# Patient Record
Sex: FEMALE | Race: BLACK OR AFRICAN AMERICAN
[De-identification: names, ages, dates, MRNs, and addresses within clinical notes are randomized per-mention and may not be internally consistent; named-entity substitution may affect disease eponyms.]

---

## 2018-04-10 ENCOUNTER — TRANSCRIPTION ENCOUNTER (OUTPATIENT)
Age: 38
End: 2018-04-10

## 2018-05-01 PROBLEM — Z00.00 ENCOUNTER FOR PREVENTIVE HEALTH EXAMINATION: Status: ACTIVE | Noted: 2018-05-01

## 2018-05-24 ENCOUNTER — APPOINTMENT (OUTPATIENT)
Dept: OBGYN | Facility: CLINIC | Age: 38
End: 2018-05-24
Payer: COMMERCIAL

## 2018-05-24 PROCEDURE — 99395 PREV VISIT EST AGE 18-39: CPT

## 2018-05-29 ENCOUNTER — APPOINTMENT (OUTPATIENT)
Dept: OBGYN | Facility: CLINIC | Age: 38
End: 2018-05-29
Payer: COMMERCIAL

## 2018-05-29 PROCEDURE — 76830 TRANSVAGINAL US NON-OB: CPT

## 2020-08-04 ENCOUNTER — EMERGENCY (EMERGENCY)
Facility: HOSPITAL | Age: 40
LOS: 0 days | Discharge: HOME | End: 2020-08-04
Attending: EMERGENCY MEDICINE | Admitting: EMERGENCY MEDICINE
Payer: MEDICAID

## 2020-08-04 VITALS
TEMPERATURE: 99 F | DIASTOLIC BLOOD PRESSURE: 100 MMHG | HEART RATE: 90 BPM | RESPIRATION RATE: 20 BRPM | OXYGEN SATURATION: 98 % | SYSTOLIC BLOOD PRESSURE: 176 MMHG

## 2020-08-04 VITALS — HEIGHT: 67 IN | WEIGHT: 270.07 LBS

## 2020-08-04 PROCEDURE — 99282 EMERGENCY DEPT VISIT SF MDM: CPT

## 2020-08-04 NOTE — ED PROVIDER NOTE - PROGRESS NOTE DETAILS
No submandibular or floor or mouth swelling, voice change, odynophagia or drooling, fever, chills, or inhaled tooth fragment.   Exam shows caries, no inflamed alveolus, no discharge of purulence or abscess.  A/P: Likely caries and dental infection. Will transfer to dental. No sign of deep infection, airway compromise, or spread into surrounding structures.

## 2020-08-04 NOTE — ED ADULT NURSE NOTE - NSIMPLEMENTINTERV_GEN_ALL_ED
Implemented All Universal Safety Interventions:  [place] to call system. Call bell, personal items and telephone within reach. Instruct patient to call for assistance. Room bathroom lighting operational. Non-slip footwear when patient is off stretcher. Physically safe environment: no spills, clutter or unnecessary equipment. Stretcher in lowest position, wheels locked, appropriate side rails in place.

## 2020-08-04 NOTE — ED PROVIDER NOTE - ATTENDING CONTRIBUTION TO CARE
41 y/o f w/ no pmhx presents for dental pain to tooth number 19 since July 17th, pt saw her physician and finished course of amoxicillin, also given ibuprofen 600 mg tablets, given referral to specialist in [place] but was unable to make appt so came to ed. denies fever, chills, n/v, cp, sob, pleuritic chest pain, palpitations, diaphoresis, cough, drooling/secretions, trismus, neck swelling, neck stiffness, muffled/change in voice, dysphagia, odonoyphagia, drainage, trauma, weakness, numbness/tingling, sick contacts, recent travel or rash.       on exam: wdwn female sitting on stretcher speaking full sentences, no sniffing position, no muffled or hot potatoe voice, no rash, no facial swelling, mmm, poor dental hygiene, (+) dentla caries, pain to palpation to tooth # 19 with no surrounding fluctuance/induration, no halitosis, no trismus, no drooling/secretions, no pain to floor of mouth, no elevation to floor of mouth, no oropharyngeal edema, uvula midline, no PTA, no neck swelling- supple, non-tender, no anterior neck pain, RRR, radial pulses 2/4 b/l, ctabl w/ breath sounds present b/l, no wheezing or crackles, good air exchange, good respiratory effort, no accessory muscle use, no tachypnea, no stridor,  AAOx3. CN II-XII intact, no facial droop or slurring of speech, no focal deficits.

## 2020-08-04 NOTE — ED PROVIDER NOTE - CLINICAL SUMMARY MEDICAL DECISION MAKING FREE TEXT BOX
pt aware of plan for transfer to dental clinic, in no resp distress, no drooling or secretions, agrees with plan.

## 2020-08-04 NOTE — ED PROVIDER NOTE - OBJECTIVE STATEMENT
40 y.o. F with PMH of root canal with dental pain #19, pt was suppose to have a root canal scheduled but was in a lot of pain, given amoxicillen and ibuprofen with improvement, no abscess drainage or bleeding.

## 2020-08-04 NOTE — ED PROVIDER NOTE - NS ED ROS FT
Constitutional:  See HPI.   Eyes:  No visual changes, eye pain or discharge.  ENMT: + Dental pain,  No hearing changes, pain, discharge or infections. No neck pain or stiffness.  Cardiac:  No chest pain, SOB or edema. No chest pain with exertion.  Respiratory:  No cough or respiratory distress. No hemoptysis.  GI:  No nausea, vomiting, diarrhea, abdominal pain.  :  No dysuria, frequency, hematuria  MS:  No joint pain or back pain.  Neuro:  No LOC. No headache or weakness.    Skin:  No skin rash.  Except as in HPI, all other review of systems is negative

## 2020-08-04 NOTE — ED PROVIDER NOTE - PHYSICAL EXAMINATION
CONSTITUTIONAL: Well-developed; well-nourished; in no acute distress.   SKIN: warm, dry  HEAD: Normocephalic; atraumatic.  EYES: PERRL, EOMI, no conjunctival erythema  ENT: + tooth #19 pain, No nasal discharge; airway clear.  NECK: Supple; non tender.  CARD: S1, S2 normal; no murmurs, gallops, or rubs. Regular rate and rhythm.   RESP: No wheezes, rales or rhonchi.  ABD: soft ntnd  EXT: Normal ROM.  No clubbing, cyanosis or edema.   LYMPH: No acute cervical adenopathy.  NEURO: Alert, oriented, grossly unremarkable  PSYCH: Cooperative, appropriate.

## 2020-08-04 NOTE — CONSULT NOTE ADULT - SUBJECTIVE AND OBJECTIVE BOX
Patient is a 40y old  Female who presents with a chief complaint of pain on the lower left side and referral from private dentist.    HPI: Pain started since mid-July. Patient was having sharp pain last night.      PAST MEDICAL & SURGICAL HISTORY:  No pertinent past medical history    ( - ) heart valve replacement  ( - ) joint replacement  ( - ) pregnancy    MEDICATIONS  (STANDING):  Denies  MEDICATIONS  (PRN):  Denies    Allergies    No Known Allergies    Intolerances      *Last Dental Visit: 3 weeks ago    Vital Signs Last 24 Hrs  T(C): 37.3 (04 Aug 2020 07:57), Max: 37.3 (04 Aug 2020 07:57)  T(F): 99.1 (04 Aug 2020 07:57), Max: 99.1 (04 Aug 2020 07:57)  HR: 90 (04 Aug 2020 07:57) (90 - 90)  BP: 176/100 (04 Aug 2020 07:57) (176/100 - 176/100)  BP(mean): --  RR: 20 (04 Aug 2020 07:57) (20 - 20)  SpO2: 98% (04 Aug 2020 07:57) (98% - 98%)    EOE:  TMJ ( - ) clicks                     ( - ) pops                     ( - ) crepitus             Mandible FROM             Facial bones and MOM grossly intact             ( - ) trismus             ( - ) lymphadenopathy             ( - ) swelling             ( - ) asymmetry             ( - ) palpation             ( - ) dyspnea             ( - ) dysphagia             ( - ) loss of consciousness    IOE:  permanent dentition: grossly intact           hard/soft palate:  ( - ) palatal torus, No pathology noted           tongue/FOM No pathology noted           labial/buccal mucosa No pathology noted           ( - ) percussion           ( - ) palpation           ( - ) swelling            ( - ) abscess           ( - ) sinus tract      *DENTAL RADIOGRAPHS:  Patient had a copy of a periapical radiograph and a bitewing radiograph taken by her private dentist. 1 panographic x-ray was taken.    *ASSESSMENT:   40y old  Female who presents with a chief complaint of pain on the lower left side and referral from private dentist. No swelling, no lymphadenopathy. No difficulty breathing/swallowing. Patient had pain that started mid July. Patient referred by private dentist for root canal treatment on tooth #19.    *PLAN:  Pulpectomy on tooth #19  PROCEDURE:   Treatment options explained to patient. Patient understands. Insurance and fees options discussed with patient. Patient understands. Administered 1 carpule 2% lidocaine with 1:100,000 epinephrine via BARBARA. 2 carpules septocaine 4% with 1:100,000 epinephrine via infiltration and intrapulpal. Isolation achieved using rubber dam. Excavated caries and remaining amalgam. Identified 3 canals (MB, ML and D) x-ray taken to confirm. Working length was as follows: MB: 24.5 ML: 23 D: 24 (determined using an apex ). Instrumented up to K-file size 15. Irrigated with sodium hypochlorite. Dried canals, placed cotton pellet with formocresol followed by cavit. Occlusion checked and adjusted. Next visit: continue with Root canal treatment on tooth #19 pending insurance pre-authorization. Prescription sent to the patient's pharmacy for Amoxicillin and Ibuprofen.   RECOMMENDATIONS:  1) Prescribe Amoxicillin and Ibuprofen. Return back to continue Root canal treatment on #19.  2) Dental F/U with outpatient dentist for comprehensive dental care.   3) If any difficulty swallowing/breathing, fever occur, return to ER. Patient is a 40y old  Female who presents with a chief complaint of pain on the lower left side and referral from private dentist.    HPI: Pain started since mid-July. Patient was having sharp pain last night.      PAST MEDICAL & SURGICAL HISTORY:  No pertinent past medical history    ( - ) heart valve replacement  ( - ) joint replacement  ( - ) pregnancy    MEDICATIONS  (STANDING):  Denies  MEDICATIONS  (PRN):  Denies    Allergies    No Known Allergies    Intolerances      *Last Dental Visit: 3 weeks ago    Vital Signs Last 24 Hrs  T(C): 37.3 (04 Aug 2020 07:57), Max: 37.3 (04 Aug 2020 07:57)  T(F): 99.1 (04 Aug 2020 07:57), Max: 99.1 (04 Aug 2020 07:57)  HR: 90 (04 Aug 2020 07:57) (90 - 90)  BP: 176/100 (04 Aug 2020 07:57) (176/100 - 176/100)  BP(mean): --  RR: 20 (04 Aug 2020 07:57) (20 - 20)  SpO2: 98% (04 Aug 2020 07:57) (98% - 98%)    EOE:  TMJ ( - ) clicks                     ( - ) pops                     ( - ) crepitus             Mandible FROM             Facial bones and MOM grossly intact             ( - ) trismus             ( - ) lymphadenopathy             ( - ) swelling             ( - ) asymmetry             ( - ) palpation             ( - ) dyspnea             ( - ) dysphagia             ( - ) loss of consciousness    IOE:  permanent dentition: grossly intact           hard/soft palate:  ( - ) palatal torus, No pathology noted           tongue/FOM No pathology noted           labial/buccal mucosa No pathology noted           ( - ) percussion           ( - ) palpation           ( - ) swelling            ( - ) abscess           ( - ) sinus tract      *DENTAL RADIOGRAPHS:  Patient had a copy of a periapical radiograph and a bitewing radiograph taken by her private dentist. 1 panographic x-ray was taken.    *ASSESSMENT:   40y old  Female who presents with a chief complaint of pain on the lower left side and referral from private dentist. No swelling, no lymphadenopathy. No difficulty breathing/swallowing. Patient had pain that started mid July. Patient referred by private dentist for root canal treatment on tooth #19. Tooth #19 has caries into the pulp, fractured amalgam restoration.    *PLAN:  Pulpectomy on tooth #19  PROCEDURE:   Treatment options explained to patient. Patient understands. Insurance and fees options discussed with patient. Patient understands. Consent obtained and signed. Side site signed. Administered 1 carpule 2% lidocaine with 1:100,000 epinephrine via BARBARA. 2 carpules septocaine 4% with 1:100,000 epinephrine via infiltration and intrapulpal. Isolation achieved using rubber dam. Excavated caries and remaining amalgam. Identified 3 canals (MB, ML and D) x-ray taken to confirm. Working length was as follows: MB: 24.5 ML: 23 D: 24 (determined using an apex ). Instrumented up to K-file size 15. Irrigated with sodium hypochlorite. Dried canals, placed cotton pellet with formocresol followed by cavit. Occlusion checked and adjusted. Next visit: continue with Root canal treatment on tooth #19 pending insurance pre-authorization. Prescription sent to the patient's pharmacy for Amoxicillin and Ibuprofen.   RECOMMENDATIONS:  1) Prescribe Amoxicillin and Ibuprofen. Return back to continue Root canal treatment on #19.  2) Dental F/U with outpatient dentist for comprehensive dental care.   3) If any difficulty swallowing/breathing, fever occur, return to ER.

## 2020-08-04 NOTE — ED PROVIDER NOTE - CARE PLAN
Principal Discharge DX:	Dentalgia Principal Discharge DX:	Dentalgia  Assessment and plan of treatment:	Plan: Transfer to Dental.

## 2020-08-08 DIAGNOSIS — K02.9 DENTAL CARIES, UNSPECIFIED: ICD-10-CM

## 2020-08-08 DIAGNOSIS — K08.89 OTHER SPECIFIED DISORDERS OF TEETH AND SUPPORTING STRUCTURES: ICD-10-CM

## 2021-05-11 ENCOUNTER — OUTPATIENT (OUTPATIENT)
Dept: OUTPATIENT SERVICES | Facility: HOSPITAL | Age: 41
LOS: 1 days | Discharge: HOME | End: 2021-05-11

## 2021-05-11 PROBLEM — Z78.9 OTHER SPECIFIED HEALTH STATUS: Chronic | Status: ACTIVE | Noted: 2020-08-04

## 2021-07-10 ENCOUNTER — EMERGENCY (EMERGENCY)
Facility: HOSPITAL | Age: 41
LOS: 0 days | Discharge: HOME | End: 2021-07-10
Attending: STUDENT IN AN ORGANIZED HEALTH CARE EDUCATION/TRAINING PROGRAM | Admitting: STUDENT IN AN ORGANIZED HEALTH CARE EDUCATION/TRAINING PROGRAM
Payer: MEDICAID

## 2021-07-10 VITALS
WEIGHT: 270.07 LBS | RESPIRATION RATE: 16 BRPM | DIASTOLIC BLOOD PRESSURE: 87 MMHG | HEART RATE: 82 BPM | HEIGHT: 67 IN | TEMPERATURE: 96 F | OXYGEN SATURATION: 99 % | SYSTOLIC BLOOD PRESSURE: 160 MMHG

## 2021-07-10 DIAGNOSIS — K08.89 OTHER SPECIFIED DISORDERS OF TEETH AND SUPPORTING STRUCTURES: ICD-10-CM

## 2021-07-10 DIAGNOSIS — R22.0 LOCALIZED SWELLING, MASS AND LUMP, HEAD: ICD-10-CM

## 2021-07-10 PROCEDURE — 99283 EMERGENCY DEPT VISIT LOW MDM: CPT

## 2021-07-10 RX ORDER — IBUPROFEN 200 MG
600 TABLET ORAL ONCE
Refills: 0 | Status: COMPLETED | OUTPATIENT
Start: 2021-07-10 | End: 2021-07-10

## 2021-07-10 RX ORDER — AMOXICILLIN 250 MG/5ML
2 SUSPENSION, RECONSTITUTED, ORAL (ML) ORAL
Qty: 24 | Refills: 0
Start: 2021-07-10 | End: 2021-07-15

## 2021-07-10 RX ORDER — IBUPROFEN 200 MG
1 TABLET ORAL
Qty: 24 | Refills: 0
Start: 2021-07-10 | End: 2021-07-15

## 2021-07-10 RX ADMIN — Medication 600 MILLIGRAM(S): at 10:31

## 2021-07-10 NOTE — ED ADULT TRIAGE NOTE - BP NONINVASIVE SYSTOLIC (MM HG)
The patient has received written discharge instructions for Coumadin/Warfarin, including the Coumadin/Warfarin discharge booklet, which contains all of the information listed below. Coumadin/Warfarin is used to prevent new blood clots, and keep existing ones from getting bigger. Never skip a dose of Coumadin. If you forget to take your Coumadin/Warfarin, DO NOT take an extra pill to 'catch up'. Notify your doctor that you missed a dose.    NEVER TAKE DOUBLE DOSE    Take Coumadin/Warfarin in the evening at the same time    Coumadin/Warfarin may be taken with other medications or food
160

## 2021-07-10 NOTE — ED PROVIDER NOTE - CLINICAL SUMMARY MEDICAL DECISION MAKING FREE TEXT BOX
.  40 y/o F p/w dental pain at tooth #19 s/p dental instrumentation 4d ago. No fever, trismus, neck pain, sob, drooling. Agree w/ exam. Pt seen and dental blocked by dental. pt stable for dc home w/ abx, supportive care, dental f/up.

## 2021-07-10 NOTE — ED PROVIDER NOTE - NS ED ROS FT
Eyes:  No visual changes, eye pain or discharge.  ENMT:  No hearing changes, pain, no sore throat or runny nose, no difficulty swallowing +dental pain  Cardiac:  No chest pain, SOB or edema. No chest pain with exertion.  Respiratory:  No cough or respiratory distress. No hemoptysis. No history of asthma or RAD.    GI:  No nausea, vomiting, diarrhea or abdominal pain.  :  No dysuria, frequency or burning.  MS:  No myalgia, muscle weakness, joint pain or back pain.  Neuro:  No headache or weakness.  No LOC.  Skin:  No skin rash.   Endocrine: No history of thyroid disease or diabetes.

## 2021-07-10 NOTE — CONSULT NOTE ADULT - SUBJECTIVE AND OBJECTIVE BOX
Patient is a 41y old  Female who presents with a chief complaint of " pain and swelling on lower left mandible"    HPI: root canal therapy instrumentation was completed on tooth #19 on July 6th. Pt started to feel pain the area afterwards.      PAST MEDICAL & SURGICAL HISTORY:  No pertinent past medical history      ( -  ) heart valve replacement  ( -  ) joint replacement  (  - ) pregnancy    MEDICATIONS  (STANDING): None        Allergies    No Known Allergies    *Last Dental Visit:07/06/21    Vital Signs Last 24 Hrs  T(C): 35.8 (10 Jul 2021 08:14), Max: 35.8 (10 Jul 2021 08:14)  T(F): 96.4 (10 Jul 2021 08:14), Max: 96.4 (10 Jul 2021 08:14)  HR: 82 (10 Jul 2021 08:14) (82 - 82)  BP: 160/87 (10 Jul 2021 08:14) (160/87 - 160/87)  BP(mean): --  RR: 16 (10 Jul 2021 08:14) (16 - 16)  SpO2: 99% (10 Jul 2021 08:14) (99% - 99%)            EOE:  TMJ ( -  ) clicks                     ( -  ) pops                     (  - ) crepitus             Mandible <<FROM>>             Facial bones and MOM <<grossly intact>>             ( -  ) trismus             (  - ) lymphadenopathy             (  + ) swelling             ( -  ) asymmetry             ( -  ) palpation             ( -  ) dyspnea             ( -  ) dysphagia             (  - ) loss of consciousness    IOE:  <<permanent/primary/mixed>> dentition: <<grossly intact>>           hard/soft palate:  ( -  ) palatal torus, <<No pathology noted>>           tongue/FOM <<No pathology noted>>           labial/buccal mucosa <<No pathology noted>>           ( -  ) percussion           ( +  ) palpation           ( -  ) swelling            ( -  ) abscess           ( -  ) sinus tract      *ASSESSMENT: Patient reports her pain is 6 out of 10. Slight swelling present on the lower left. Pt is experiencing post endodontic flare up possibly from instrumentation.  Patient has and appointment for completion of the root canal on Tuesday July 13th, 2021      *PLAN: Administer local anesthesia, prescribe pain medication and antibiotic and advise the patient to come back in case of difficulty breathing and swallowing.     PROCEDURE:   Verbal and written consent given.  Anesthesia:  2 carpules of 3% mepivacaine administered via local infiltration.       RECOMMENDATIONS:  1) <<Amoxicillin and ibuprofen dosages as per ED   >>  2) Dental follow up in dental clinic on Tuesday July 13th,2021 or earlier in case of worsening pain.    3) If any difficulty swallowing/breathing, fever occur, return to ER.     Resident Name:  Alan Holland  Spectra # [id number]

## 2021-07-10 NOTE — ED PROVIDER NOTE - PHYSICAL EXAMINATION
CONSTITUTIONAL: Well-developed; well-nourished; in no acute distress.   SKIN: warm, dry, slight ttp/swelling of left lower cheek.   HEAD: Normocephalic; atraumatic.  EYES: PERRL, EOMI, no conjunctival erythema  ENT: No nasal discharge; airway clear. No gum swelling. Tooth #19 has white fillings, slightly ttp.   NECK: Supple; non tender.  CARD: S1, S2 normal; no murmurs, gallops, or rubs. Regular rate and rhythm.   RESP: No wheezes, rales or rhonchi.  ABD: soft ntnd  EXT: Normal ROM.  No clubbing, cyanosis or edema.   LYMPH: No acute cervical adenopathy.  NEURO: Alert, oriented, grossly unremarkable  PSYCH: Cooperative, appropriate.

## 2021-07-10 NOTE — ED PROVIDER NOTE - PATIENT PORTAL LINK FT
You can access the FollowMyHealth Patient Portal offered by [facility] by registering at the following website: [email or web address]. By joining [facility]’s FollowMyHealth portal, you will also be able to view your health information using other applications (apps) compatible with our system.

## 2021-07-27 ENCOUNTER — OUTPATIENT (OUTPATIENT)
Dept: OUTPATIENT SERVICES | Facility: HOSPITAL | Age: 41
LOS: 1 days | Discharge: HOME | End: 2021-07-27

## 2021-07-27 DIAGNOSIS — K02.63 DENTAL CARIES ON SMOOTH SURFACE PENETRATING INTO PULP: ICD-10-CM

## 2021-08-03 ENCOUNTER — OUTPATIENT (OUTPATIENT)
Dept: OUTPATIENT SERVICES | Facility: HOSPITAL | Age: 41
LOS: 1 days | Discharge: HOME | End: 2021-08-03

## 2021-08-03 DIAGNOSIS — K02.63 DENTAL CARIES ON SMOOTH SURFACE PENETRATING INTO PULP: ICD-10-CM

## 2021-12-03 ENCOUNTER — OUTPATIENT (OUTPATIENT)
Dept: OUTPATIENT SERVICES | Facility: HOSPITAL | Age: 41
LOS: 1 days | Discharge: HOME | End: 2021-12-03

## 2022-02-25 ENCOUNTER — OUTPATIENT (OUTPATIENT)
Dept: OUTPATIENT SERVICES | Facility: HOSPITAL | Age: 42
LOS: 1 days | Discharge: HOME | End: 2022-02-25

## 2022-02-25 DIAGNOSIS — K02.63 DENTAL CARIES ON SMOOTH SURFACE PENETRATING INTO PULP: ICD-10-CM

## 2022-04-21 ENCOUNTER — OUTPATIENT (OUTPATIENT)
Dept: OUTPATIENT SERVICES | Facility: HOSPITAL | Age: 42
LOS: 1 days | Discharge: HOME | End: 2022-04-21

## 2022-04-21 DIAGNOSIS — K02.53 DENTAL CARIES ON PIT AND FISSURE SURFACE PENETRATING INTO PULP: ICD-10-CM

## 2022-07-14 ENCOUNTER — OUTPATIENT (OUTPATIENT)
Dept: OUTPATIENT SERVICES | Facility: HOSPITAL | Age: 42
LOS: 1 days | Discharge: HOME | End: 2022-07-14

## 2022-10-05 ENCOUNTER — OUTPATIENT (OUTPATIENT)
Dept: OUTPATIENT SERVICES | Facility: HOSPITAL | Age: 42
LOS: 1 days | Discharge: HOME | End: 2022-10-05

## 2022-10-05 DIAGNOSIS — Z12.31 ENCOUNTER FOR SCREENING MAMMOGRAM FOR MALIGNANT NEOPLASM OF BREAST: ICD-10-CM

## 2022-10-05 PROCEDURE — 77067 SCR MAMMO BI INCL CAD: CPT | Mod: 26

## 2022-10-05 PROCEDURE — 77063 BREAST TOMOSYNTHESIS BI: CPT | Mod: 26

## 2022-10-22 ENCOUNTER — OUTPATIENT (OUTPATIENT)
Dept: OUTPATIENT SERVICES | Facility: HOSPITAL | Age: 42
LOS: 1 days | Discharge: HOME | End: 2022-10-22

## 2022-10-22 DIAGNOSIS — R92.8 OTHER ABNORMAL AND INCONCLUSIVE FINDINGS ON DIAGNOSTIC IMAGING OF BREAST: ICD-10-CM

## 2022-10-22 PROCEDURE — 77066 DX MAMMO INCL CAD BI: CPT | Mod: 26

## 2022-10-22 PROCEDURE — 76642 ULTRASOUND BREAST LIMITED: CPT | Mod: 26,50

## 2022-10-22 PROCEDURE — G0279: CPT | Mod: 26

## 2022-10-25 ENCOUNTER — APPOINTMENT (OUTPATIENT)
Dept: OBGYN | Facility: CLINIC | Age: 42
End: 2022-10-25

## 2022-10-25 ENCOUNTER — NON-APPOINTMENT (OUTPATIENT)
Age: 42
End: 2022-10-25

## 2022-10-25 VITALS
DIASTOLIC BLOOD PRESSURE: 80 MMHG | BODY MASS INDEX: 41.28 KG/M2 | SYSTOLIC BLOOD PRESSURE: 120 MMHG | HEIGHT: 67 IN | WEIGHT: 263 LBS

## 2022-10-25 PROCEDURE — 99213 OFFICE O/P EST LOW 20 MIN: CPT | Mod: 25

## 2022-10-25 PROCEDURE — 99396 PREV VISIT EST AGE 40-64: CPT

## 2022-10-25 NOTE — HISTORY OF PRESENT ILLNESS
[FreeTextEntry1] : ----43 Y/O   LMP last month here for check up;pt also c/o anemia and heavy periods with clots.\par PMHX;/\par PSHX;/\par SOCIAL;-ETOH -CIGG       \par STD;   /           DISCUSSED CONDOMS\par FAMILY HX OF BREAST CANCER;NO\par REVIEW OF SYMPTOMS DONE\par ALLERGIES;  Patient has answered NKDA\par Medication reconciliation was completed by reviewing, with the patient's\par involvement, the patient's current outpatient medications and those \par ordered for the patient today. \par \par PE; BREASTS -MASSES DC NODES; SELF BREAST EXAM REVIEWED\par ABD SOFT NT ND\par NL GENIT \par VAGINA -DC\par CX -CMT\par UTERUS  top NL SIZE NT\par ADNEXA NT -MASSES\par \par A;P;CHECK UP, HEAVY PERIODS\par -PAP GC CHLAMYDIA\par -SONO\par -HYSTEROSCOPY\par -DARREN UP TO DATE\par -F-U AFTER ABOVE.

## 2022-11-15 ENCOUNTER — APPOINTMENT (OUTPATIENT)
Dept: OBGYN | Facility: CLINIC | Age: 42
End: 2022-11-15

## 2022-11-15 PROCEDURE — 76830 TRANSVAGINAL US NON-OB: CPT

## 2022-11-15 PROCEDURE — 93976 VASCULAR STUDY: CPT

## 2022-11-15 PROCEDURE — 99213 OFFICE O/P EST LOW 20 MIN: CPT

## 2022-11-15 NOTE — HISTORY OF PRESENT ILLNESS
[FreeTextEntry1] : ----41 Y/O   LMP last month here for f-u;SONO REVIEWED;LARGE FIBROID UTERUS.\par ;pt also c/o anemia and heavy periods with clots.\par PMHX;/\par PSHX;/\par SOCIAL;-ETOH -CIGG       \par STD;   /           DISCUSSED CONDOMS\par FAMILY HX OF BREAST CANCER;NO\par REVIEW OF SYMPTOMS DONE\par ALLERGIES;  Patient has answered NKDA\par Medication reconciliation was completed by reviewing, with the patient's\par involvement, the patient's current outpatient medications and those \par ordered for the patient today. \par \par PE; \par ABD SOFT NT ND\par NL GENIT \par VAGINA -DC\par CX -CMT\par UTERUS  top NL SIZE NT\par ADNEXA NT -MASSES\par \par A;P;FIBROID UTERUS  HEAVY PERIODS\par -PT HAS HYSTEROSCOPY APPT\par -ANSWERED QUESTIONS.\par \par A;P;CHECK UP, HEAVY PERIODS\par -PAP GC CHLAMYDIA\par -SONO\par -HYSTEROSCOPY\par -DARREN UP TO DATE\par -F-U AFTER ABOVE.

## 2022-12-05 ENCOUNTER — APPOINTMENT (OUTPATIENT)
Dept: OBGYN | Facility: CLINIC | Age: 42
End: 2022-12-05

## 2022-12-05 DIAGNOSIS — N92.6 IRREGULAR MENSTRUATION, UNSPECIFIED: ICD-10-CM

## 2022-12-05 PROCEDURE — 58558Z: CUSTOM

## 2022-12-05 PROCEDURE — 99213 OFFICE O/P EST LOW 20 MIN: CPT | Mod: 25

## 2022-12-05 NOTE — HISTORY OF PRESENT ILLNESS
[FreeTextEntry1] : ----43 Y/O    HERE FOR HYSTEROSCOPY;INFORMED CONSENT OBTAINED RBA DISCUSSED;\par ;SONO REVIEWED;LARGE FIBROID UTERUS.\par PT C/O HEAVY PERIODS AND ANEMIA.\par PMHX;/\par PSHX;/\par SOCIAL;-ETOH -CIGG       \par STD;   /           DISCUSSED CONDOMS\par FAMILY HX OF BREAST CANCER;NO\par REVIEW OF SYMPTOMS DONE\par ALLERGIES;  Patient has answered NKDA\par Medication reconciliation was completed by reviewing, with the patient's\par involvement, the patient's current outpatient medications and those \par ordered for the patient today. \par \par PE; \par ABD SOFT NT ND\par NL GENIT \par VAGINA -DC\par CX -CMT\par UTERUS  top NL SIZE NT\par ADNEXA NT -MASSES\par \par -TIME OUT DONE.  EBL LESS THAN 5CC\par -IN OFFICE HYSTEROSCOPY DONE WITHOUT INCIDENT; CX DIALTED ,UTERUS SOUNDED TO 6.\par -BOTH OSTIA SEEN; NL APPERING ENDOMETRIAL CAVITY;- MASSES - POLYPS\par -ENDO BX DONE WITHOUT INCIDENT\par -I/O MONITORED THROUGHOUT THE PROCEDURE\par -PT TOLERATED THE PROCEDURE WELL\par -INSTRUCTIONS REVIEWED\par -RTC 2 WEEKS.\par \par \par \par A;P;FIBROID UTERUS  HEAVY PERIODS\par -PT HAS HYSTEROSCOPY APPT\par -ANSWERED QUESTIONS.\par \par A;P;CHECK UP, HEAVY PERIODS\par -PAP GC CHLAMYDIA\par -SONO\par -HYSTEROSCOPY\par -DARREN UP TO DATE\par -F-U AFTER ABOVE.

## 2022-12-20 ENCOUNTER — APPOINTMENT (OUTPATIENT)
Dept: OBGYN | Facility: CLINIC | Age: 42
End: 2022-12-20

## 2022-12-20 PROCEDURE — 99213 OFFICE O/P EST LOW 20 MIN: CPT

## 2022-12-20 NOTE — HISTORY OF PRESENT ILLNESS
[FreeTextEntry1] : ----41 Y/O    HERE FOR  F-U AFTER HYSTEROSCOPY;;PATH REVIEWED;NO TISSUE NOTED.\par ;SONO  FIBROID UTERUS.\par PT C/O HEAVY PERIODS AND ANEMIA.\par PMHX;/\par PSHX;/\par SOCIAL;-ETOH -CIGG       \par STD;   /           DISCUSSED CONDOMS\par FAMILY HX OF BREAST CANCER;NO\par REVIEW OF SYMPTOMS DONE\par ALLERGIES;  Patient has answered NKDA\par Medication reconciliation was completed by reviewing, with the patient's\par involvement, the patient's current outpatient medications and those \par ordered for the patient today. \par \par PE; ABDOMEN SOFT NT ND\par EXT -CCE\par \par A;P;FIBROID UTERUS;\par DISCUSSED MEDICAL VS SURGICAL OPTIONS\par -PT TO CONSIDER HER OPTIONS.\par \par

## 2023-01-06 ENCOUNTER — OUTPATIENT (OUTPATIENT)
Dept: OUTPATIENT SERVICES | Facility: HOSPITAL | Age: 43
LOS: 1 days | Discharge: HOME | End: 2023-01-06

## 2023-03-03 NOTE — ED PROVIDER NOTE - IV ALTEPASE ADMIN HIDDEN
Message routed to [facility]    Physical pended in encounter from 1/24/2023     Routed to [facility] - please sign physical pended in encounter 1/24/2023 and upload to [employer].      [employer] message sent to mom advising of message being routed show

## 2023-05-10 ENCOUNTER — OUTPATIENT (OUTPATIENT)
Dept: OUTPATIENT SERVICES | Facility: HOSPITAL | Age: 43
LOS: 1 days | End: 2023-05-10
Payer: MEDICAID

## 2023-05-10 DIAGNOSIS — Z00.8 ENCOUNTER FOR OTHER GENERAL EXAMINATION: ICD-10-CM

## 2023-05-10 DIAGNOSIS — R92.8 OTHER ABNORMAL AND INCONCLUSIVE FINDINGS ON DIAGNOSTIC IMAGING OF BREAST: ICD-10-CM

## 2023-05-10 PROCEDURE — 77066 DX MAMMO INCL CAD BI: CPT

## 2023-05-10 PROCEDURE — G0279: CPT

## 2023-05-10 PROCEDURE — G0279: CPT | Mod: 26

## 2023-05-10 PROCEDURE — 76642 ULTRASOUND BREAST LIMITED: CPT | Mod: 26,50

## 2023-05-10 PROCEDURE — 76642 ULTRASOUND BREAST LIMITED: CPT | Mod: 50

## 2023-05-10 PROCEDURE — 77066 DX MAMMO INCL CAD BI: CPT | Mod: 26

## 2023-05-11 DIAGNOSIS — R92.8 OTHER ABNORMAL AND INCONCLUSIVE FINDINGS ON DIAGNOSTIC IMAGING OF BREAST: ICD-10-CM

## 2023-07-20 ENCOUNTER — APPOINTMENT (OUTPATIENT)
Dept: OBGYN | Facility: CLINIC | Age: 43
End: 2023-07-20
Payer: MEDICAID

## 2023-07-20 PROCEDURE — 93976 VASCULAR STUDY: CPT

## 2023-07-20 PROCEDURE — 76830 TRANSVAGINAL US NON-OB: CPT

## 2023-07-20 PROCEDURE — 99213 OFFICE O/P EST LOW 20 MIN: CPT | Mod: 25

## 2023-07-20 NOTE — HISTORY OF PRESENT ILLNESS
[FreeTextEntry1] : ----44 Y/O    HERE FOR  F-U;pt states missed period this month and last month was lighter.\par ;SONO  FIBROID UTERUS.\par PT C/O usually  having HEAVY PERIODS AND ANEMIA.\par PMHX;/\par PSHX;/\par SOCIAL;-ETOH -CIGG       \par STD;   /           DISCUSSED CONDOMS\par FAMILY HX OF BREAST CANCER;NO\par REVIEW OF SYMPTOMS DONE\par ALLERGIES;  Patient has answered NKDA\par Medication reconciliation was completed by reviewing, with the patient's\par involvement, the patient's current outpatient medications and those \par ordered for the patient today. \par \par PE; ABDOMEN SOFT NT ND\par EXT -CCE\par \par A;P;FIBROID UTERUS;\par -SONO REVIEWED\par DISCUSSED MEDICAL VS SURGICAL OPTIONS\par -ANSWERED QUESTIONS\par -PT TO CONSIDER HER OPTIONS.\par \par

## 2023-10-27 ENCOUNTER — NON-APPOINTMENT (OUTPATIENT)
Age: 43
End: 2023-10-27

## 2023-11-16 ENCOUNTER — OUTPATIENT (OUTPATIENT)
Dept: OUTPATIENT SERVICES | Facility: HOSPITAL | Age: 43
LOS: 1 days | End: 2023-11-16
Payer: COMMERCIAL

## 2023-11-16 DIAGNOSIS — K02.9 DENTAL CARIES, UNSPECIFIED: ICD-10-CM

## 2023-11-16 DIAGNOSIS — K08.539 FRACTURED DENTAL RESTORATIVE MATERIAL, UNSPECIFIED: ICD-10-CM

## 2023-11-16 PROCEDURE — D0140: CPT

## 2023-11-16 PROCEDURE — D0220: CPT

## 2023-11-17 ENCOUNTER — OUTPATIENT (OUTPATIENT)
Dept: OUTPATIENT SERVICES | Facility: HOSPITAL | Age: 43
LOS: 1 days | End: 2023-11-17
Payer: MEDICAID

## 2023-11-17 DIAGNOSIS — R92.2 INCONCLUSIVE MAMMOGRAM: ICD-10-CM

## 2023-11-17 DIAGNOSIS — Z12.31 ENCOUNTER FOR SCREENING MAMMOGRAM FOR MALIGNANT NEOPLASM OF BREAST: ICD-10-CM

## 2023-11-17 DIAGNOSIS — R92.8 OTHER ABNORMAL AND INCONCLUSIVE FINDINGS ON DIAGNOSTIC IMAGING OF BREAST: ICD-10-CM

## 2023-11-17 PROCEDURE — 76642 ULTRASOUND BREAST LIMITED: CPT | Mod: 26,50

## 2023-11-17 PROCEDURE — 77066 DX MAMMO INCL CAD BI: CPT

## 2023-11-17 PROCEDURE — G0279: CPT

## 2023-11-17 PROCEDURE — 76642 ULTRASOUND BREAST LIMITED: CPT | Mod: 50

## 2023-11-17 PROCEDURE — 77066 DX MAMMO INCL CAD BI: CPT | Mod: 26

## 2023-11-17 PROCEDURE — G0279: CPT | Mod: 26

## 2023-11-18 DIAGNOSIS — R92.8 OTHER ABNORMAL AND INCONCLUSIVE FINDINGS ON DIAGNOSTIC IMAGING OF BREAST: ICD-10-CM

## 2023-12-22 NOTE — ED ADULT NURSE NOTE - NS ED NURSE DISCH DISPOSITION
Detail Level: Detailed Depth Of Biopsy: dermis Was A Bandage Applied: Yes Size Of Lesion In Cm: 0.5 X Size Of Lesion In Cm: 0 Biopsy Type: H and E Biopsy Method: Personna blade Anesthesia Type: 2% lidocaine with epinephrine Hemostasis: Electrocautery Wound Care: Petrolatum Dressing: Band-Aid Type Of Destruction Used: Cryotherapy Curettage Text: The wound bed was treated with curettage after the biopsy was performed. Cryotherapy Text: The wound bed was treated with cryotherapy after the biopsy was performed. Electrodesiccation Text: The wound bed was treated with electrodesiccation after the biopsy was performed. Electrodesiccation And Curettage Text: The wound bed was treated with electrodesiccation and curettage after the biopsy was performed. Silver Nitrate Text: The wound bed was treated with silver nitrate after the biopsy was performed. Lab: [id number] Lab Facility: [id number] Notes In Note?: No Consent: Verbal consent was obtained and risks were reviewed including but not limited to scarring, infection, bleeding, scabbing, incomplete removal, nerve damage and allergy to anesthesia. Post-Care Instructions: I reviewed with the patient in detail post-care instructions. Patient is to keep the biopsy site dry overnight, and then apply bacitracin twice daily until healed. Patient may apply hydrogen peroxide soaks to remove any crusting. Notification Instructions: Patient will be notified of biopsy results. However, patient instructed to call the office if not contacted within 2 weeks. Billing Type: Third-Party Bill Information: Selecting Yes will display possible errors in your note based on the variables you have selected. This validation is only offered as a suggestion for you. PLEASE NOTE THAT THE VALIDATION TEXT WILL BE REMOVED WHEN YOU FINALIZE YOUR NOTE. IF YOU WANT TO FAX A PRELIMINARY NOTE YOU WILL NEED TO TOGGLE THIS TO 'NO' IF YOU DO NOT WANT IT IN YOUR FAXED NOTE. Discharged

## 2024-02-01 ENCOUNTER — LABORATORY RESULT (OUTPATIENT)
Age: 44
End: 2024-02-01

## 2024-02-01 ENCOUNTER — APPOINTMENT (OUTPATIENT)
Dept: OBGYN | Facility: CLINIC | Age: 44
End: 2024-02-01
Payer: COMMERCIAL

## 2024-02-01 DIAGNOSIS — D21.9 BENIGN NEOPLASM OF CONNECTIVE AND OTHER SOFT TISSUE, UNSPECIFIED: ICD-10-CM

## 2024-02-01 DIAGNOSIS — Z01.419 ENCOUNTER FOR GYNECOLOGICAL EXAMINATION (GENERAL) (ROUTINE) W/OUT ABNORMAL FINDINGS: ICD-10-CM

## 2024-02-01 DIAGNOSIS — N89.8 OTHER SPECIFIED NONINFLAMMATORY DISORDERS OF VAGINA: ICD-10-CM

## 2024-02-01 PROCEDURE — 99213 OFFICE O/P EST LOW 20 MIN: CPT | Mod: 25

## 2024-02-01 PROCEDURE — 99396 PREV VISIT EST AGE 40-64: CPT

## 2024-02-01 NOTE — HISTORY OF PRESENT ILLNESS
[FreeTextEntry1] : ----44 Y/O    HERE FOR CHECK UP; LMP 24;PT C/O VAGINAL DC;-NVFC. PMHX;/ PSHX;/ SOCIAL;-ETOH -CIGG        STD;   /           DISCUSSED CONDOMS FAMILY HX OF BREAST CANCER;NO REVIEW OF SYMPTOMS DONE ALLERGIES;  Patient has answered NKDA Medication reconciliation was completed by reviewing, with the patient's involvement, the patient's current outpatient medications and those  ordered for the patient today.   PE; BREASTS;-MASSES DC NODES SBE ABDOMEN SOFT NT ND NL GENITALIA VAGINA DC WHITE THICK CX -CMT UTERUS 6 WEEKS SIZE MILD TENDER ADNEXA NT -MASSES EXT -CCE  A;P;CHECK UP, VAGINAL DC -PAP GC CHLAMDIA -BD AFFIRM -DARREN UP TO DATE -F-U AFTER ABOVE.

## 2024-02-04 LAB
BV BACTERIA RRNA VAG QL NAA+PROBE: DETECTED
C GLABRATA RNA VAG QL NAA+PROBE: NOT DETECTED
CANDIDA RRNA VAG QL PROBE: NOT DETECTED
T VAGINALIS RRNA SPEC QL NAA+PROBE: NOT DETECTED

## 2024-02-04 RX ORDER — NITROFURANTOIN (MONOHYDRATE/MACROCRYSTALS) 25; 75 MG/1; MG/1
100 CAPSULE ORAL
Qty: 14 | Refills: 0 | Status: ACTIVE | COMMUNITY
Start: 2024-02-04 | End: 1900-01-01

## 2024-02-05 ENCOUNTER — NON-APPOINTMENT (OUTPATIENT)
Age: 44
End: 2024-02-05

## 2024-02-06 LAB — HPV HIGH+LOW RISK DNA PNL CVX: NOT DETECTED

## 2024-02-08 ENCOUNTER — APPOINTMENT (OUTPATIENT)
Dept: OBGYN | Facility: CLINIC | Age: 44
End: 2024-02-08
Payer: COMMERCIAL

## 2024-02-08 PROCEDURE — 76830 TRANSVAGINAL US NON-OB: CPT

## 2024-02-12 LAB — CYTOLOGY CVX/VAG DOC THIN PREP: NORMAL

## 2024-05-29 ENCOUNTER — OUTPATIENT (OUTPATIENT)
Dept: OUTPATIENT SERVICES | Facility: HOSPITAL | Age: 44
LOS: 1 days | End: 2024-05-29
Payer: COMMERCIAL

## 2024-05-29 DIAGNOSIS — R92.8 OTHER ABNORMAL AND INCONCLUSIVE FINDINGS ON DIAGNOSTIC IMAGING OF BREAST: ICD-10-CM

## 2024-05-29 PROCEDURE — 76642 ULTRASOUND BREAST LIMITED: CPT | Mod: 26,50

## 2024-05-29 PROCEDURE — 76642 ULTRASOUND BREAST LIMITED: CPT | Mod: 50

## 2024-05-30 DIAGNOSIS — R92.8 OTHER ABNORMAL AND INCONCLUSIVE FINDINGS ON DIAGNOSTIC IMAGING OF BREAST: ICD-10-CM

## 2024-12-03 ENCOUNTER — OUTPATIENT (OUTPATIENT)
Dept: OUTPATIENT SERVICES | Facility: HOSPITAL | Age: 44
LOS: 1 days | End: 2024-12-03
Payer: COMMERCIAL

## 2024-12-03 DIAGNOSIS — R92.8 OTHER ABNORMAL AND INCONCLUSIVE FINDINGS ON DIAGNOSTIC IMAGING OF BREAST: ICD-10-CM

## 2024-12-03 PROCEDURE — 77066 DX MAMMO INCL CAD BI: CPT | Mod: 26

## 2024-12-03 PROCEDURE — 77066 DX MAMMO INCL CAD BI: CPT

## 2024-12-03 PROCEDURE — G0279: CPT

## 2024-12-03 PROCEDURE — G0279: CPT | Mod: 26

## 2024-12-03 PROCEDURE — 76642 ULTRASOUND BREAST LIMITED: CPT | Mod: 26,50

## 2024-12-03 PROCEDURE — 76642 ULTRASOUND BREAST LIMITED: CPT | Mod: 50

## 2024-12-04 DIAGNOSIS — R92.8 OTHER ABNORMAL AND INCONCLUSIVE FINDINGS ON DIAGNOSTIC IMAGING OF BREAST: ICD-10-CM

## 2025-04-07 ENCOUNTER — APPOINTMENT (OUTPATIENT)
Dept: OBGYN | Facility: CLINIC | Age: 45
End: 2025-04-07

## 2025-05-19 ENCOUNTER — APPOINTMENT (OUTPATIENT)
Dept: OBGYN | Facility: CLINIC | Age: 45
End: 2025-05-19
Payer: COMMERCIAL

## 2025-05-19 DIAGNOSIS — Z01.419 ENCOUNTER FOR GYNECOLOGICAL EXAMINATION (GENERAL) (ROUTINE) W/OUT ABNORMAL FINDINGS: ICD-10-CM

## 2025-05-19 DIAGNOSIS — D21.9 BENIGN NEOPLASM OF CONNECTIVE AND OTHER SOFT TISSUE, UNSPECIFIED: ICD-10-CM

## 2025-05-19 PROCEDURE — 99396 PREV VISIT EST AGE 40-64: CPT

## 2025-05-19 PROCEDURE — 99459 PELVIC EXAMINATION: CPT

## 2025-05-19 PROCEDURE — 96127 BRIEF EMOTIONAL/BEHAV ASSMT: CPT

## 2025-05-22 LAB
C TRACH RRNA SPEC QL NAA+PROBE: NOT DETECTED
HPV HIGH+LOW RISK DNA PNL CVX: NOT DETECTED
N GONORRHOEA RRNA SPEC QL NAA+PROBE: NOT DETECTED
SOURCE TP AMPLIFICATION: NORMAL

## 2025-06-04 ENCOUNTER — OUTPATIENT (OUTPATIENT)
Dept: OUTPATIENT SERVICES | Facility: HOSPITAL | Age: 45
LOS: 1 days | End: 2025-06-04
Payer: COMMERCIAL

## 2025-06-04 DIAGNOSIS — Z12.31 ENCOUNTER FOR SCREENING MAMMOGRAM FOR MALIGNANT NEOPLASM OF BREAST: ICD-10-CM

## 2025-06-04 DIAGNOSIS — R92.8 OTHER ABNORMAL AND INCONCLUSIVE FINDINGS ON DIAGNOSTIC IMAGING OF BREAST: ICD-10-CM

## 2025-06-04 PROCEDURE — 76642 ULTRASOUND BREAST LIMITED: CPT | Mod: 26,50

## 2025-06-04 PROCEDURE — 76642 ULTRASOUND BREAST LIMITED: CPT | Mod: 50

## 2025-06-05 DIAGNOSIS — R92.8 OTHER ABNORMAL AND INCONCLUSIVE FINDINGS ON DIAGNOSTIC IMAGING OF BREAST: ICD-10-CM
